# Patient Record
Sex: MALE | Race: WHITE | NOT HISPANIC OR LATINO | Employment: OTHER | URBAN - METROPOLITAN AREA
[De-identification: names, ages, dates, MRNs, and addresses within clinical notes are randomized per-mention and may not be internally consistent; named-entity substitution may affect disease eponyms.]

---

## 2017-11-27 ENCOUNTER — APPOINTMENT (OUTPATIENT)
Dept: LAB | Facility: CLINIC | Age: 67
End: 2017-11-27
Payer: MEDICARE

## 2017-11-27 ENCOUNTER — TRANSCRIBE ORDERS (OUTPATIENT)
Dept: LAB | Facility: CLINIC | Age: 67
End: 2017-11-27

## 2017-11-27 DIAGNOSIS — E78.5 HYPERLIPIDEMIA, UNSPECIFIED HYPERLIPIDEMIA TYPE: Primary | ICD-10-CM

## 2017-11-27 LAB
CHOLEST SERPL-MCNC: 174 MG/DL (ref 50–200)
HDLC SERPL-MCNC: 51 MG/DL (ref 40–60)
LDLC SERPL CALC-MCNC: 105 MG/DL (ref 0–100)
TRIGL SERPL-MCNC: 89 MG/DL

## 2017-11-27 PROCEDURE — 36415 COLL VENOUS BLD VENIPUNCTURE: CPT

## 2017-11-27 PROCEDURE — 80061 LIPID PANEL: CPT

## 2018-03-01 ENCOUNTER — TRANSCRIBE ORDERS (OUTPATIENT)
Dept: LAB | Facility: CLINIC | Age: 68
End: 2018-03-01

## 2018-03-01 ENCOUNTER — APPOINTMENT (OUTPATIENT)
Dept: LAB | Facility: CLINIC | Age: 68
End: 2018-03-01
Payer: MEDICARE

## 2018-03-01 DIAGNOSIS — N40.3 NODULAR PROSTATE WITH URINARY OBSTRUCTION: ICD-10-CM

## 2018-03-01 DIAGNOSIS — R97.20 ELEVATED PROSTATE SPECIFIC ANTIGEN (PSA): ICD-10-CM

## 2018-03-01 DIAGNOSIS — N13.8 NODULAR PROSTATE WITH URINARY OBSTRUCTION: ICD-10-CM

## 2018-03-01 DIAGNOSIS — N13.8 ENLARGED PROSTATE WITH URINARY OBSTRUCTION: Primary | ICD-10-CM

## 2018-03-01 DIAGNOSIS — N40.1 ENLARGED PROSTATE WITH URINARY OBSTRUCTION: Primary | ICD-10-CM

## 2018-03-01 DIAGNOSIS — N40.1 ENLARGED PROSTATE WITH URINARY OBSTRUCTION: ICD-10-CM

## 2018-03-01 DIAGNOSIS — N13.8 ENLARGED PROSTATE WITH URINARY OBSTRUCTION: ICD-10-CM

## 2018-03-01 LAB — PSA SERPL-MCNC: 1.6 NG/ML (ref 0–4)

## 2018-03-01 PROCEDURE — 84153 ASSAY OF PSA TOTAL: CPT

## 2018-05-29 ENCOUNTER — APPOINTMENT (OUTPATIENT)
Dept: LAB | Facility: CLINIC | Age: 68
End: 2018-05-29
Payer: MEDICARE

## 2018-05-29 ENCOUNTER — TRANSCRIBE ORDERS (OUTPATIENT)
Dept: LAB | Facility: CLINIC | Age: 68
End: 2018-05-29

## 2018-05-29 DIAGNOSIS — E78.2 MIXED HYPERLIPIDEMIA: Primary | ICD-10-CM

## 2018-05-29 LAB
CHOLEST SERPL-MCNC: 150 MG/DL (ref 50–200)
HDLC SERPL-MCNC: 45 MG/DL (ref 40–60)
LDLC SERPL CALC-MCNC: 85 MG/DL (ref 0–100)
NONHDLC SERPL-MCNC: 105 MG/DL
TRIGL SERPL-MCNC: 99 MG/DL

## 2018-05-29 PROCEDURE — 36415 COLL VENOUS BLD VENIPUNCTURE: CPT

## 2018-05-29 PROCEDURE — 80061 LIPID PANEL: CPT

## 2018-09-12 ENCOUNTER — HOSPITAL ENCOUNTER (EMERGENCY)
Facility: HOSPITAL | Age: 68
Discharge: HOME | End: 2018-09-12
Attending: EMERGENCY MEDICINE
Payer: MEDICARE

## 2018-09-12 VITALS
RESPIRATION RATE: 18 BRPM | TEMPERATURE: 98.9 F | BODY MASS INDEX: 30.78 KG/M2 | SYSTOLIC BLOOD PRESSURE: 141 MMHG | WEIGHT: 215 LBS | OXYGEN SATURATION: 97 % | HEART RATE: 91 BPM | HEIGHT: 70 IN | DIASTOLIC BLOOD PRESSURE: 89 MMHG

## 2018-09-12 DIAGNOSIS — S01.511A LIP LACERATION, INITIAL ENCOUNTER: Primary | ICD-10-CM

## 2018-09-12 DIAGNOSIS — S60.812A ABRASION OF LEFT WRIST, INITIAL ENCOUNTER: ICD-10-CM

## 2018-09-12 DIAGNOSIS — S09.90XA CLOSED HEAD INJURY, INITIAL ENCOUNTER: ICD-10-CM

## 2018-09-12 PROCEDURE — 90715 TDAP VACCINE 7 YRS/> IM: CPT | Performed by: PHYSICIAN ASSISTANT

## 2018-09-12 PROCEDURE — 99283 EMERGENCY DEPT VISIT LOW MDM: CPT

## 2018-09-12 PROCEDURE — 90471 IMMUNIZATION ADMIN: CPT

## 2018-09-12 RX ORDER — AMOXICILLIN AND CLAVULANATE POTASSIUM 875; 125 MG/1; MG/1
1 TABLET, FILM COATED ORAL EVERY 12 HOURS SCHEDULED
Qty: 10 TABLET | Refills: 0 | Status: SHIPPED | OUTPATIENT
Start: 2018-09-12 | End: 2018-09-17

## 2018-09-12 RX ADMIN — TETANUS TOXOID, REDUCED DIPHTHERIA TOXOID AND ACELLULAR PERTUSSIS VACCINE, ADSORBED 0.5 ML: 5; 2.5; 8; 8; 2.5 SUSPENSION INTRAMUSCULAR at 18:51

## 2018-09-12 RX ADMIN — Medication 1 APPLICATION: at 18:32

## 2018-09-12 NOTE — DISCHARGE INSTRUCTIONS
Facial Laceration   WHAT YOU NEED TO KNOW:   A facial laceration is a tear or cut in the skin caused by blunt or shearing forces, or sharp objects  Facial lacerations may be closed within 24 hours of injury  DISCHARGE INSTRUCTIONS:   Return to the emergency department if:   · You have a fever and the wound is painful, warm, or swollen  The wound area may be red, or fluid may come out of it  · You have heavy bleeding or bleeding that does not stop after 10 minutes of holding firm, direct pressure over the wound  Contact your healthcare provider if:   · Your wound reopens or your tape comes off  · Your wound is very painful  · Your wound is not healing, or you think there is an object in the wound  · The skin around your wound stays numb  · You have questions or concerns about your condition or care  Medicines:   · Antibiotics  may be given to prevent an infection if your wound was deep and had to be cleaned out  · Take your medicine as directed  Contact your healthcare provider if you think your medicine is not helping or if you have side effects  Tell him of her if you are allergic to any medicine  Keep a list of the medicines, vitamins, and herbs you take  Include the amounts, and when and why you take them  Bring the list or the pill bottles to follow-up visits  Carry your medicine list with you in case of an emergency  Care for your wound: It is okay to shower and gently wash her face starting this evening  Do not scrub the area  Do not submerge the area, I e  baths, swimming until it is healed and the sutures were removed  · If your wound was closed with stitches,  keep your wound clean  Decrease scarring: The skin in the area of your wound may turn a different color if it is exposed to direct sunlight  After your wound is healed, use sunscreen over the area when you are out in the sun  You should do this for at least 6 months to 1 year after your injury   Some wounds scar less if they are covered while they heal   Follow up with your healthcare provider as directed:  Have the sutures removed in 5-7 days  This can be done at urgent care, the emergency department, or possibly your family doctor  Check with your family doctor and ask if they are able to do this for you if you wish  © 2017 [address] [employer] Information is for End User's use only and may not be sold, redistributed or otherwise used for commercial purposes  All illustrations and images included in CareNotes® are the copyrighted property of [employer] , curated.by  or Nelson Mobley  The above information is an  only  It is not intended as medical advice for individual conditions or treatments  Talk to your doctor, nurse or pharmacist before following any medical regimen to see if it is safe and effective for you

## 2018-09-12 NOTE — ED NOTES
PT awake and alert, no distress noted  No other questions upon d/c       April Angeli Herbert, RN  09/12/18 4648

## 2018-09-13 NOTE — ED PROVIDER NOTES
History  Chief Complaint   Patient presents with    Fall     patient reports falling approx  4 feet today around 4:30-5pm today without loss of conciousness  patient does not take blood thinning medications  c/o having upper lip problems and pain  80-year-old male presents for evaluation status post fall  He was standing on a step ladder, 2 or so feet above the ground trimming tree branches when the step stool gave out, and he fell forward on his face, striking his upper lip on a tree root that was sticking up out of the ground  No loss of conscious, no vomiting, no amnesia, no headache, no neck pain  No numbness or tingling  The lip as blood quite a lot  Has been icing it since then  Has a small abrasion of the left wrist, but no significant pain, no other complaints  None       Past Medical History:   Diagnosis Date    Hypertension        Past Surgical History:   Procedure Laterality Date    NECK SURGERY         History reviewed  No pertinent family history  I have reviewed and agree with the history as documented  Social History   Substance Use Topics    Smoking status: Never Smoker    Smokeless tobacco: Never Used    Alcohol use No        Review of Systems   Constitutional: Negative for activity change, chills, fatigue and fever  HENT: Negative for congestion, ear pain, facial swelling, nosebleeds, postnasal drip, rhinorrhea, sinus pain, sinus pressure, sore throat and trouble swallowing  Eyes: Negative for visual disturbance  Respiratory: Negative for cough, chest tightness and shortness of breath  Cardiovascular: Negative for chest pain, palpitations and leg swelling  Gastrointestinal: Negative for abdominal pain, blood in stool, constipation, diarrhea, nausea and vomiting  Genitourinary: Negative for dysuria, flank pain, frequency and hematuria  Musculoskeletal: Negative for arthralgias, back pain, neck pain and neck stiffness  Skin: Positive for wound  Negative for rash  Neurological: Negative for dizziness, seizures, syncope, light-headedness, numbness and headaches  All other systems reviewed and are negative  Physical Exam  Physical Exam   Constitutional: He is oriented to person, place, and time  He appears well-developed and well-nourished  No distress  HENT:   Right Ear: External ear normal    Left Ear: External ear normal    Nose: Nose normal    Mouth/Throat: Oropharynx is clear and moist        Upper lip laceration in a 3 armed, stellate pattern  Approximately 2 mm deep  Not full thickness  The inner aspect of the upper lip also has a corresponding macerated, stellate laceration that is very superficial     All teeth are inspected and are not loose  No chips or cracks in any teeth  No septal hematoma    Symmetric facial movement   Eyes: Conjunctivae and EOM are normal  Pupils are equal, round, and reactive to light  Neck: Normal range of motion  Neck supple  No midline cervical spine tenderness   Cardiovascular: Normal rate, regular rhythm and normal heart sounds  Exam reveals no gallop and no friction rub  No murmur heard  Pulmonary/Chest: Effort normal and breath sounds normal  No respiratory distress  He has no wheezes  He has no rales  He exhibits no tenderness  Musculoskeletal: Normal range of motion  Neurological: He is alert and oriented to person, place, and time  Skin: Skin is warm and dry  He is not diaphoretic  Psychiatric: He has a normal mood and affect  His behavior is normal  Judgment and thought content normal    Vitals reviewed        Vital Signs  ED Triage Vitals [09/12/18 1739]   Temperature Pulse Respirations Blood Pressure SpO2   98 9 °F (37 2 °C) 91 18 141/89 97 %      Temp Source Heart Rate Source Patient Position - Orthostatic VS BP Location FiO2 (%)   Oral Monitor Sitting Left arm --      Pain Score       3           Vitals:    09/12/18 1739   BP: 141/89   Pulse: 91   Patient Position - Orthostatic VS: Sitting       Visual Acuity      ED Medications  Medications   LET gel 1 application (1 application Topical Given 9/12/18 4792)   tetanus-diphtheria-acellular pertussis (BOOSTRIX) IM injection 0 5 mL (0 5 mL Intramuscular Given 9/12/18 3271)       Diagnostic Studies  Results Reviewed     None                 No orders to display              Procedures  Lac Repair  Date/Time: 9/12/2018 8:14 PM  Performed by: Toya Muñoz by: Gerardo Castro   Consent: Verbal consent obtained  Risks and benefits: risks, benefits and alternatives were discussed  Consent given by: patient  Patient identity confirmed: provided demographic data  Body area: head/neck  Location details: upper lip  Full thickness lip laceration: no  [place] border involved: no  Laceration length: 2 (total length) cm  Foreign bodies: no foreign bodies  Tendon involvement: none  Nerve involvement: none  Vascular damage: no    Anesthesia:  Local Anesthetic: LET (lido,epi,tetracaine)    Sedation:  Patient sedated: no    Wound Dehiscence:  Superficial Wound Dehiscence: simple closure      Procedure Details:  Irrigation solution: saline  Irrigation method: syringe  Amount of cleaning: standard  Debridement: none  Degree of undermining: none  Skin closure: 5-0 nylon  Number of sutures: 3  Technique: simple  Approximation: close  Approximation difficulty: simple  Patient tolerance: Patient tolerated the procedure well with no immediate complications             Phone Contacts  ED Phone Contact    ED Course                               MDM  Number of Diagnoses or Management Options  Abrasion of left wrist, initial encounter: new and does not require workup  Closed head injury, initial encounter: new and does not require workup  Lip laceration, initial encounter: new and does not require workup  Diagnosis management comments: 53-year-old man presents for evaluation status post fall with upper lip laceration     No other significant injuries, no indication for head CT/C-spine imaging  The laceration was repaired with 3 sutures, no complications, tolerated well  Tetanus updated  We will treat prophylactically with 5 days of Augmentin given the location  Instructed to return to ED or urgent care in 5-7 days for suture removal   Discussed signs of infection that would warrant return to the emergency department  Patient understood and agreed with treatment plan and had no questions  CritCare Time    Disposition  Final diagnoses:   Lip laceration, initial encounter   Closed head injury, initial encounter   Abrasion of left wrist, initial encounter     Time reflects when diagnosis was documented in both MDM as applicable and the Disposition within this note     Time User Action Codes Description Comment    9/12/2018  7:25 PM Dayna Liu [[id number]] Lip laceration, initial encounter     9/12/2018  7:25 PM Kelli Hatfield [[id number]] Closed head injury, initial encounter     9/12/2018  7:25 PM Kelli Hatfield [[id number]] Abrasion of left wrist, initial encounter       ED Disposition     ED Disposition Condition Comment    Discharge  Irena Schmitz discharge to home/self care  Condition at discharge: Good        Follow-up Information     Follow up With Specialties Details Why Contact Info    Urgent care, your family doctor, or emergency department   in 5-7 days for suture removal           Discharge Medication List as of 9/12/2018  7:30 PM      START taking these medications    Details   amoxicillin-clavulanate (AUGMENTIN) 875-125 mg per tablet Take 1 tablet by mouth every 12 (twelve) hours for 5 days, Starting Wed 9/12/2018, Until Mon 9/17/2018, Normal           No discharge procedures on file      ED Provider  Electronically Signed by           Isaac Sampson PA-C  09/12/18 2017

## 2021-04-08 DIAGNOSIS — Z23 ENCOUNTER FOR IMMUNIZATION: ICD-10-CM

## 2022-04-04 ENCOUNTER — TELEPHONE (OUTPATIENT)
Dept: GASTROENTEROLOGY | Facility: CLINIC | Age: 72
End: 2022-04-04

## 2022-04-04 NOTE — TELEPHONE ENCOUNTER
Scheduled date of colonoscopy (as of today):6/6/22  Physician performing colonoscopy:Dr Rodriguez  Location of colonoscopy:[place]  Bowel prep reviewed with patient:MIralax w/ mag & dul  Instructions reviewed with patient by:ls   Clearances: n/a

## 2022-04-04 NOTE — TELEPHONE ENCOUNTER
[facility] Assessment    Name: Kat Mackey  YOB: 1950  Last Height: 5' 10" (1 778 m)  Last weight: 97 5 kg (215 lb)  BMI: None  Procedure: Colon  Diagnosis: hx of sessile polyp/hx of tubular adenoma   Date of procedure: 6/6/22  Prep: Miralax w/ mag & dul   Responsible : yes   Phone#: [phone]  Name completing form: Mac Burden  Date form completed: 04/04/22    If the patient answers yes to any of these questions, schedule in a hospital  Are you pregnant: No  Do you rely on a wheelchair for mobility: No  Have you been diagnosed with End Stage Renal Disease (ESRD): No  Do you need oxygen during the day: No  Have you had a heart attack or stroke within the past three months: No  Have you had a seizure within the past three months: No  Have you ever been informed by anesthesia that you have a difficult airway: No  Additional Questions  Have you had any cardiac testing or are under the care of a Cardiologist (see cardiac list): No  Cardiac list:   Do you have an implanted cardiac defibrillator: No (Comment:  This patient should be scheduled in the hospital)    Have any bleeding problems, such as anemia or hemophilia (If patient has H&H result below 8, schedule in hospital   H&H must be within 30 days of procedure): No    Had an organ transplant within the past 3 months: No    Do you have any present infections: No  Do you get short of breath when walking a few blocks: No  Have you been diagnosed with diabetes: No  Comments (provide cardiac provider information if applicable):

## 2022-05-27 ENCOUNTER — TELEPHONE (OUTPATIENT)
Dept: GASTROENTEROLOGY | Facility: CLINIC | Age: 72
End: 2022-05-27

## 2022-05-27 NOTE — TELEPHONE ENCOUNTER
Spoke to pt confirming his colonoscopy scheduled on 6/6/22 with Dr Stephen Madrigal at [facility]  I informed that [facility] would be calling him 1-2 days prior with arrival time  I informed pt of clear liquid diet day prior as well as doing the bowel cleansing preparation  I also informed that pt will need a  the day of the procedure due to being under sedation  Pt believes that he has his instructions and if he does not or has any questions, he will give us a call

## 2022-06-06 ENCOUNTER — HOSPITAL ENCOUNTER (OUTPATIENT)
Dept: GASTROENTEROLOGY | Facility: AMBULATORY SURGERY CENTER | Age: 72
Discharge: HOME | End: 2022-06-06
Payer: COMMERCIAL

## 2022-06-06 ENCOUNTER — ANESTHESIA EVENT (OUTPATIENT)
Dept: GASTROENTEROLOGY | Facility: AMBULATORY SURGERY CENTER | Age: 72
End: 2022-06-06

## 2022-06-06 ENCOUNTER — ANESTHESIA (OUTPATIENT)
Dept: GASTROENTEROLOGY | Facility: AMBULATORY SURGERY CENTER | Age: 72
End: 2022-06-06

## 2022-06-06 VITALS
OXYGEN SATURATION: 97 % | TEMPERATURE: 97.5 F | RESPIRATION RATE: 18 BRPM | BODY MASS INDEX: 30.06 KG/M2 | HEART RATE: 60 BPM | WEIGHT: 210 LBS | HEIGHT: 70 IN | DIASTOLIC BLOOD PRESSURE: 81 MMHG | SYSTOLIC BLOOD PRESSURE: 121 MMHG

## 2022-06-06 DIAGNOSIS — Z86.010 HX OF ADENOMATOUS COLONIC POLYPS: ICD-10-CM

## 2022-06-06 DIAGNOSIS — K63.5 SESSILE COLONIC POLYP: ICD-10-CM

## 2022-06-06 PROBLEM — E55.9 VITAMIN D DEFICIENCY: Status: ACTIVE | Noted: 2017-05-30

## 2022-06-06 PROBLEM — R31.29 MICROHEMATURIA: Status: ACTIVE | Noted: 2019-09-12

## 2022-06-06 PROBLEM — N13.8 BENIGN PROSTATIC HYPERPLASIA WITH URINARY OBSTRUCTION: Status: ACTIVE | Noted: 2017-01-13

## 2022-06-06 PROBLEM — I77.79 CELIAC ARTERY DISSECTION (HCC): Status: ACTIVE | Noted: 2019-11-25

## 2022-06-06 PROBLEM — M16.12 OSTEOARTHRITIS OF LEFT HIP: Status: ACTIVE | Noted: 2020-07-28

## 2022-06-06 PROBLEM — N40.1 BENIGN PROSTATIC HYPERPLASIA WITH URINARY OBSTRUCTION: Status: ACTIVE | Noted: 2017-01-13

## 2022-06-06 PROCEDURE — 88305 TISSUE EXAM BY PATHOLOGIST: CPT | Performed by: PATHOLOGY

## 2022-06-06 PROCEDURE — 45380 COLONOSCOPY AND BIOPSY: CPT | Performed by: INTERNAL MEDICINE

## 2022-06-06 PROCEDURE — 45385 COLONOSCOPY W/LESION REMOVAL: CPT | Performed by: INTERNAL MEDICINE

## 2022-06-06 RX ORDER — ROSUVASTATIN CALCIUM 10 MG/1
1 TABLET, COATED ORAL DAILY
COMMUNITY
Start: 2022-04-10

## 2022-06-06 RX ORDER — SODIUM CHLORIDE 9 MG/ML
INJECTION, SOLUTION INTRAVENOUS CONTINUOUS PRN
Status: DISCONTINUED | OUTPATIENT
Start: 2022-06-06 | End: 2022-06-06

## 2022-06-06 RX ORDER — LISINOPRIL 20 MG/1
20 TABLET ORAL DAILY
COMMUNITY
Start: 2022-04-10

## 2022-06-06 RX ORDER — PROPOFOL 10 MG/ML
INJECTION, EMULSION INTRAVENOUS AS NEEDED
Status: DISCONTINUED | OUTPATIENT
Start: 2022-06-06 | End: 2022-06-06

## 2022-06-06 RX ORDER — AMLODIPINE BESYLATE 5 MG/1
TABLET ORAL
COMMUNITY
Start: 2022-04-10

## 2022-06-06 RX ADMIN — PROPOFOL 30 MG: 10 INJECTION, EMULSION INTRAVENOUS at 10:41

## 2022-06-06 RX ADMIN — PROPOFOL 20 MG: 10 INJECTION, EMULSION INTRAVENOUS at 10:38

## 2022-06-06 RX ADMIN — PROPOFOL 20 MG: 10 INJECTION, EMULSION INTRAVENOUS at 10:47

## 2022-06-06 RX ADMIN — SODIUM CHLORIDE: 9 INJECTION, SOLUTION INTRAVENOUS at 10:31

## 2022-06-06 RX ADMIN — PROPOFOL 140 MG: 10 INJECTION, EMULSION INTRAVENOUS at 10:35

## 2022-06-06 RX ADMIN — PROPOFOL 20 MG: 10 INJECTION, EMULSION INTRAVENOUS at 10:44

## 2022-06-06 NOTE — ANESTHESIA PREPROCEDURE EVALUATION
Procedure:  COLONOSCOPY    Relevant Problems   CARDIO   (+) Benign essential hypertension   (+) Mixed hyperlipidemia      /RENAL   (+) Benign prostatic hyperplasia with urinary obstruction      MUSCULOSKELETAL   (+) Osteoarthritis of left hip       Physical Exam    Airway    Mallampati score: II  TM Distance: >3 FB  Neck ROM: full     Dental       Cardiovascular      Pulmonary      Other Findings        Anesthesia Plan  ASA Score- 2     Anesthesia Type- IV sedation with anesthesia with ASA Monitors  Additional Monitors:   Airway Plan:           Plan Factors-Exercise tolerance (METS): >4 METS  Chart reviewed  EKG reviewed  Imaging results reviewed  Existing labs reviewed  Patient summary reviewed  Induction- intravenous  Postoperative Plan- Plan for postoperative opioid use  Informed Consent- Anesthetic plan and risks discussed with patient  I personally reviewed this patient with the CRNA  Discussed and agreed on the Anesthesia Plan with the CRNA  Juan Miller

## 2022-06-06 NOTE — ANESTHESIA POSTPROCEDURE EVALUATION
Post-Op Assessment Note    CV Status:  Stable  Pain Score: 0    Pain management: adequate     Mental Status:  Alert and awake   Hydration Status:  Euvolemic   PONV Controlled:  Controlled   Airway Patency:  Patent      Post Op Vitals Reviewed: Yes      Staff: CRNA         No complications documented      BP   104/68   Temp      Pulse  63   Resp   18   SpO2   98%

## 2022-06-06 NOTE — H&P
History and Physical -  [facility]  Mel Haynes 81 y o  male MRN: [id number]                  HPI: Mel Haynes is a 81y o  year old male who presents for history of polyps      REVIEW OF SYSTEMS: Per the HPI, and otherwise unremarkable  Historical Information   Past Medical History:   Diagnosis Date    Arthritis     Colon polyp     Hyperlipidemia     Hypertension      Past Surgical History:   Procedure Laterality Date    COLONOSCOPY      NECK SURGERY      2008    TOTAL HIP ARTHROPLASTY Left     7/2019     Social History   Social History     Substance and Sexual Activity   Alcohol Use Yes    Comment: 5 drinks/week     Social History     Substance and Sexual Activity   Drug Use No     Social History     Tobacco Use   Smoking Status Never Smoker   Smokeless Tobacco Never Used     Family History   Problem Relation Age of Onset    Cancer Father         cancer in abdominal cavity       Meds/Allergies       Current Outpatient Medications:     amLODIPine (NORVASC) 5 mg tablet    Cholecalciferol 25 MCG (1000 UT) tablet    lisinopril (ZESTRIL) 20 mg tablet    rosuvastatin (CRESTOR) 10 MG tablet    TURMERIC PO    No Known Allergies    Objective     /86   Pulse 64   Temp 97 5 °F (36 4 °C) (Skin)   Resp 18   Ht 5' 10" (1 778 m)   Wt 95 3 kg (210 lb)   SpO2 95%   BMI 30 13 kg/m²       PHYSICAL EXAM    Gen: NAD  Head: NCAT  CV: RRR  CHEST: Clear  ABD: soft, NT/ND  EXT: no edema      ASSESSMENT/PLAN:  This is a 81y o  year old male here for colonoscopy, and he is stable and optimized for his procedure

## 2022-06-06 NOTE — H&P
History and Physical -  [facility]  Mel Haynes 81 y o  male MRN: [id number]                  HPI: Mel Haynes is a 81y o  year old male who presents for history of polyps      REVIEW OF SYSTEMS: Per the HPI, and otherwise unremarkable  Historical Information   Past Medical History:   Diagnosis Date    Arthritis     Colon polyp     Hyperlipidemia     Hypertension      Past Surgical History:   Procedure Laterality Date    COLONOSCOPY      NECK SURGERY      2008    TOTAL HIP ARTHROPLASTY Left     7/2019     Social History   Social History     Substance and Sexual Activity   Alcohol Use Yes    Comment: 5 drinks/week     Social History     Substance and Sexual Activity   Drug Use No     Social History     Tobacco Use   Smoking Status Never Smoker   Smokeless Tobacco Never Used     Family History   Problem Relation Age of Onset    Cancer Father         cancer in abdominal cavity       Meds/Allergies       Current Outpatient Medications:     amLODIPine (NORVASC) 5 mg tablet    Cholecalciferol 25 MCG (1000 UT) tablet    lisinopril (ZESTRIL) 20 mg tablet    rosuvastatin (CRESTOR) 10 MG tablet    TURMERIC PO    No Known Allergies    Objective     /86   Pulse 64   Temp 97 5 °F (36 4 °C) (Skin)   Resp 18   Ht 5' 10" (1 778 m)   Wt 95 3 kg (210 lb)   SpO2 95%   BMI 30 13 kg/m²       PHYSICAL EXAM    Gen: NAD  Head: NCAT  CV: RRR  CHEST: Clear  ABD: soft, NT/ND  EXT: no edema      ASSESSMENT/PLAN:  This is a 81y o  year old male here for colonoscopy, and he is stable and optimized for his procedure

## 2022-08-01 ENCOUNTER — EVALUATION (OUTPATIENT)
Dept: PHYSICAL THERAPY | Facility: CLINIC | Age: 72
End: 2022-08-01
Payer: COMMERCIAL

## 2022-08-01 DIAGNOSIS — M54.50 CHRONIC MIDLINE LOW BACK PAIN WITHOUT SCIATICA: Primary | ICD-10-CM

## 2022-08-01 DIAGNOSIS — G89.29 CHRONIC MIDLINE LOW BACK PAIN WITHOUT SCIATICA: Primary | ICD-10-CM

## 2022-08-01 DIAGNOSIS — R29.898 WEAKNESS OF LEFT HIP: ICD-10-CM

## 2022-08-01 PROCEDURE — 97162 PT EVAL MOD COMPLEX 30 MIN: CPT | Performed by: PHYSICAL THERAPIST

## 2022-08-01 NOTE — PROGRESS NOTES
PT Evaluation     Today's date: 2022  Patient name: Brain Fabry  : 1950  MRN: [id number]  Referring provider: Jinny Daniels  Dx:   Encounter Diagnosis     ICD-10-CM    1  Chronic midline low back pain without sciatica  M54 50     G89 29    2  Weakness of left hip  R29 898        Start Time: 1215  Stop Time: 1300  Total time in clinic (min): 45 minutes    Assessment  Assessment details: Gustavo Mayorga is a 81 y o  male who presents with s/s consistent with flexion-based preference  Patient presents to evaluation with pain/stiffness, decreased strength, endurance, and tolerance to activity  Lumbar AROM full with comparable pain noted B/L with SB L>R  Lumbar rotation AROM full with poor movement quality, rotating from the hips/knees/feet to compensate from lack of lumbar rotation  Antalgic gait seen with Trendelenburg, no trunk rotation, flattened thoracic and lumbar curvatures  Comparable pain noted with CPA glides L3/L4 and L4/L5 and L UPA glides L2-L5/S1  SLR (-) for sciatic neural tension B/L  Abnormal multifidi activation B/L  Strength/endurance deficits seen due to lack of motor control/stability of both hip and spinal stabilizers  Discussed risks, benefits, and alternatives to treatment, and answered all patient questions to patient satisfaction  Patient is a good candidate for skilled PT, and would benefit from skilled PT services to address these impairments, achieve goals, and to maximize function  Thank you for the referral     Impairments:  1) Flexion preference  2) B/L hip weakness  3) Multifidi activation deficits  4) Decreased endurance/activity tolerance    Primary movement diagnosis of difficulty with prolonged postures, decreased strength and endurance secondary to flexion based movement preference, B/L hip weakness, and multifidi activation deficits leading to stability, motor control, and endurance deficits      Pt education provided on diagnosis, prognosis, tx, plan of care, HEP, and activity modification  Initial Evaluation conducted and documented by GAMALIEL Dawkins under direct supervision of Hyacinth Villela PT, DPT  Impairments: abnormal coordination, abnormal gait, abnormal muscle firing, abnormal muscle tone, abnormal or restricted ROM, abnormal movement, activity intolerance, impaired physical strength, lacks appropriate home exercise program, pain with function and poor posture   Understanding of Dx/Px/POC: good   Prognosis: good    Goals  Impairment Goals 4-6 weeks  - Decrease pain to <4/10 at worst  - Improve lumbar AROM to equal and pain-free throughout  - Increase hip strength to 4+/5 throughout  - Increase core strength to be able to sit straight up without deviation    Functional Goals 6-8 weeks  - Return to Prior Level of Function  - Patient will be independent with HEP  - Patient will be able to stand for 15min without increased pain/compensation/difficulty  - Patient will be able to play a full golf game without increased pain/compensation/difficulty   - Patient will be able to walk for 15min without increased pain/compensation/difficulty  - Patient will be able to play tennis without increased pain/compensation/difficulty      Plan  Plan details: Assess response to initial eval and progress as tolerated per POC       Patient would benefit from: skilled physical therapy  Planned modality interventions: biofeedback, cryotherapy, electrical stimulation/Russian stimulation, thermotherapy: hydrocollator packs, TENS, unattended electrical stimulation and traction  Planned therapy interventions: abdominal trunk stabilization, activity modification, IADL retraining, joint mobilization, manual therapy, massage, [place] taping, motor coordination training, breathing training, behavior modification, flexibility, functional ROM exercises, home exercise program, therapeutic exercise, therapeutic activities, stretching, strengthening, self care, patient education, neuromuscular re-education and muscle pump exercises  Frequency: 2x week  Duration in weeks: 8        Subjective Evaluation    History of Present Illness  Mechanism of injury: WORK/SCHOOL: Retired salesman/    HOME LIFE: Lives with wife; Has grandchildren    HOBBIES/EXERCISE: Skiing    PLOF:  Fully functional; was able to do yard work    HISTORY OF CURRENT INJURY:  Needs strengthening for L hip after was replaced; had PT for past numbness to toes (strengthening mm b/w vertebrea) - reports helped; has PB at home; past HEP has helped but yoga helps most; riding bikes    Deya Roland reports to clinic with c/o stiffness due to overactivity/prolonged positions  S/S include stiffness, with noted improvement following stretching/yoga  Pt has prior experience with PT for hip replacement s/p 2 years, and N/T into the toes  He reports PT helped and attempted to self-treat presenting condition with previous PT exercises  He states he needs further strengthening for his hip and more structured exercises for the stiffness in his back  He reports feeling inflammation in the area, and that his stiffness impacts bed mobility but does not interrupt sleep  He would like to become more active, specifically with tennis which he has not attempted due to weakness    PAIN LOCATION/DESCRIPTORS: Stiffness onset when overactive; stays in "pelvic area" both sides with no c/o paripheralization    AGGRAVATING FACTORS: prolonged standing; stiffness after playing 12 holes of golf     EASES: walking; yoga (started 2-3x per week); flexion stretching feels better; ibuprofin; tumeric    DAY PATTERN: none in AM; happens when "start doing bunch of things"     IMAGING:  MRI 5-6s years ago - reported spondylolisthesis, DDD, and arthritic changes    SPECIAL QUESTIONS:    Deya Roland denies a new onset of night pain, B/B changes, saddle paraesthesia, SOB, and heart palpitations      PATIENT GOALS: be able to play tennis    Quality of life: fair    Pain  Current pain ratin  At best pain ratin  At worst pain ratin  Quality: dull ache and pressure  Relieving factors: change in position  Progression: no change    Social Support  Steps to enter house: no  Stairs in house: no           Objective     Concurrent Complaints  Negative for bladder dysfunction, bowel dysfunction and saddle (S4) numbness    Static Posture     Thoracic Spine  Hyperkyphosis and flattened thoracic spine  Lumbar Spine   Flattened  Pelvis   Anterior pelvic tilt    Postural Observations  Seated posture: fair        Tenderness     Additional Tenderness Details  Comparable pain noted with CPA glides L3/L4, L4/L5 and UPA glides L L2-L5/S1    Active Range of Motion   Cervical/Thoracic Spine       Thoracic    Flexion:  WFL  Extension:  WFL    Lumbar   Flexion:  WFL  Extension:  WFL  Left lateral flexion:  WFL and with pain  Right lateral flexion:  WFL and with pain  Left rotation:  WF  Right rotation:  [facility]    Additional Active Range of Motion Details  Comparable pain noted with B/L SB (L>R)  B/L rotation - poor quality of movement; motion coming from hips/knees/feet instead of spine    Strength/Myotome Testing     Left Hip   Planes of Motion   Flexion: 3+  Extension: 3+  Abduction: 3+  External rotation: 4  Internal rotation: 4    Right Hip   Planes of Motion   Flexion: 3+  Extension: 4-  Abduction: 4-  External rotation: 4  Internal rotation: 4    Left Knee   Flexion: 4  Extension: 4    Right Knee   Flexion: 4  Extension: 4    Left Ankle/Foot   Dorsiflexion: 4+  Inversion: 4  Eversion: 4  Great toe extension: 4    Right Ankle/Foot   Dorsiflexion: 4+  Inversion: 4  Eversion: 4  Great toe extension: 4    Muscle Activation   Patient able to activate left multifidus and right multifidus       Additional Muscle Activation Details  L mm activation - hamstrings, glutes, then multifidi  R mm activation - glutes, hamstrings, no palpable multifidi activation    Tests     Lumbar     Left   Negative passive SLR      Right   Negative passive SLR       Ambulation     Observational Gait   Gait: antalgic     Additional Observational Gait Details  Decreased thoracic rotation    Functional Assessment      Squat    within functional limits             Diagnosis: Chronic LBP   Precautions: L3/L4/L5 spondlylolisthesis; DDD   Primary Goals: Impairments:  1) Flexion preference  2) B/L hip weakness  3) Multifidi activation deficits  4) Decreased endurance/activity tolerance   *asterisks by exercise = given for HEP   Manuals 8/1                                               There Ex        Bike        LTRs        PB roll outs        X-walks        Piriformis S                                        Neuro Re-Ed        TA activation/PPT        DKTC        SKTC        Bridges        SLRs        Clamshells        Pallof Press        Dead Bugs        Mult with amps NV                                Re-evaluation              Ther Act                                         Modalities

## 2022-08-01 NOTE — LETTER
2022    Cam Edwards [address]  [address]    Patient: Teetee Patel   YOB: 1950   Date of Visit: 2022     Encounter Diagnosis     ICD-10-CM    1  Chronic midline low back pain without sciatica  M54 50     G89 29    2  Weakness of left hip  R29 898        Dear Dr Yariel Burdick: Thank you for your recent referral of Teetee Patel  Please review the attached evaluation summary from Neo's recent visit  Please verify that you agree with the plan of care by signing the attached order  If you have any questions or concerns, please do not hesitate to call  I sincerely appreciate the opportunity to share in the care of one of your patients and hope to have another opportunity to work with you in the near future  Sincerely,    Catherine Edmondson, PT      Referring Provider:      I certify that I have read the below Plan of Care and certify the need for these services furnished under this plan of treatment while under my care  Enzo Piña MD  [address] [place] [id number]  Via Fax: [phone]          PT Evaluation     Today's date: 2022  Patient name: Teetee Patel  : 1950  MRN: [id number]  Referring provider: Jez Goel*  Dx:   Encounter Diagnosis     ICD-10-CM    1  Chronic midline low back pain without sciatica  M54 50     G89 29    2  Weakness of left hip  R29 898        Start Time: 1215  Stop Time: 1300  Total time in clinic (min): 45 minutes    Assessment  Assessment details: Bhargav Bhatia is a 81 y o  male who presents with s/s consistent with flexion-based preference  Patient presents to evaluation with pain/stiffness, decreased strength, endurance, and tolerance to activity  Lumbar AROM full with comparable pain noted B/L with SB L>R   Lumbar rotation AROM full with poor movement quality, rotating from the hips/knees/feet to compensate from lack of lumbar rotation  Antalgic gait seen with Trendelenburg, no trunk rotation, flattened thoracic and lumbar curvatures  Comparable pain noted with CPA glides L3/L4 and L4/L5 and L UPA glides L2-L5/S1  SLR (-) for sciatic neural tension B/L  Abnormal multifidi activation B/L  Strength/endurance deficits seen due to lack of motor control/stability of both hip and spinal stabilizers  Discussed risks, benefits, and alternatives to treatment, and answered all patient questions to patient satisfaction  Patient is a good candidate for skilled PT, and would benefit from skilled PT services to address these impairments, achieve goals, and to maximize function  Thank you for the referral     Impairments:  1) Flexion preference  2) B/L hip weakness  3) Multifidi activation deficits  4) Decreased endurance/activity tolerance    Primary movement diagnosis of difficulty with prolonged postures, decreased strength and endurance secondary to flexion based movement preference, B/L hip weakness, and multifidi activation deficits leading to stability, motor control, and endurance deficits  Pt education provided on diagnosis, prognosis, tx, plan of care, HEP, and activity modification  Initial Evaluation conducted and documented by GAMALIEL Tinsley under direct supervision of Geraldine Choi, PT, DPT       Impairments: abnormal coordination, abnormal gait, abnormal muscle firing, abnormal muscle tone, abnormal or restricted ROM, abnormal movement, activity intolerance, impaired physical strength, lacks appropriate home exercise program, pain with function and poor posture   Understanding of Dx/Px/POC: good   Prognosis: good    Goals  Impairment Goals 4-6 weeks  - Decrease pain to <4/10 at worst  - Improve lumbar AROM to equal and pain-free throughout  - Increase hip strength to 4+/5 throughout  - Increase core strength to be able to sit straight up without deviation    Functional Goals 6-8 weeks  - Return to Prior Level of Function  - Patient will be independent with HEP  - Patient will be able to stand for 15min without increased pain/compensation/difficulty  - Patient will be able to play a full golf game without increased pain/compensation/difficulty   - Patient will be able to walk for 15min without increased pain/compensation/difficulty  - Patient will be able to play tennis without increased pain/compensation/difficulty      Plan  Plan details: Assess response to initial eval and progress as tolerated per POC  Patient would benefit from: skilled physical therapy  Planned modality interventions: biofeedback, cryotherapy, electrical stimulation/Russian stimulation, thermotherapy: hydrocollator packs, TENS, unattended electrical stimulation and traction  Planned therapy interventions: abdominal trunk stabilization, activity modification, IADL retraining, joint mobilization, manual therapy, massage, [place] taping, motor coordination training, breathing training, behavior modification, flexibility, functional ROM exercises, home exercise program, therapeutic exercise, therapeutic activities, stretching, strengthening, self care, patient education, neuromuscular re-education and muscle pump exercises  Frequency: 2x week  Duration in weeks: 8        Subjective Evaluation    History of Present Illness  Mechanism of injury: WORK/SCHOOL: Retired salesman/    HOME LIFE: Lives with wife; Has grandchildren    HOBBIES/EXERCISE: Skiing    PLOF:  Fully functional; was able to do yard work    HISTORY OF CURRENT INJURY:  Needs strengthening for L hip after was replaced; had PT for past numbness to toes (strengthening mm b/w vertebrea) - reports helped; has PB at home; past HEP has helped but yoga helps most; riding bikes    Mery Tucker reports to clinic with c/o stiffness due to overactivity/prolonged positions  S/S include stiffness, with noted improvement following stretching/yoga   Pt has prior experience with PT for hip replacement s/p 2 years, and N/T into the toes  He reports PT helped and attempted to self-treat presenting condition with previous PT exercises  He states he needs further strengthening for his hip and more structured exercises for the stiffness in his back  He reports feeling inflammation in the area, and that his stiffness impacts bed mobility but does not interrupt sleep  He would like to become more active, specifically with tennis which he has not attempted due to weakness    PAIN LOCATION/DESCRIPTORS: Stiffness onset when overactive; stays in "pelvic area" both sides with no c/o paripheralization    AGGRAVATING FACTORS: prolonged standing; stiffness after playing 12 holes of golf     EASES: walking; yoga (started 2-3x per week); flexion stretching feels better; ibuprofin; tumeric    DAY PATTERN: none in AM; happens when "start doing bunch of things"     IMAGING:  MRI 5-6s years ago - reported spondylolisthesis, DDD, and arthritic changes    SPECIAL QUESTIONS:    Heidi Chilel denies a new onset of night pain, B/B changes, saddle paraesthesia, SOB, and heart palpitations  PATIENT GOALS: be able to play tennis    Quality of life: fair    Pain  Current pain ratin  At best pain ratin  At worst pain ratin  Quality: dull ache and pressure  Relieving factors: change in position  Progression: no change    Social Support  Steps to enter house: no  Stairs in house: no           Objective     Concurrent Complaints  Negative for bladder dysfunction, bowel dysfunction and saddle (S4) numbness    Static Posture     Thoracic Spine  Hyperkyphosis and flattened thoracic spine  Lumbar Spine   Flattened       Pelvis   Anterior pelvic tilt    Postural Observations  Seated posture: fair        Tenderness     Additional Tenderness Details  Comparable pain noted with CPA glides L3/L4, L4/L5 and UPA glides L L2-L5/S1    Active Range of Motion   Cervical/Thoracic Spine       Thoracic    Flexion:  WFL  Extension:  WFL    Lumbar   Flexion: WFL  Extension:  WFL  Left lateral flexion:  WFL and with pain  Right lateral flexion:  WFL and with pain  Left rotation:  WFL  Right rotation:  [facility]    Additional Active Range of Motion Details  Comparable pain noted with B/L SB (L>R)  B/L rotation - poor quality of movement; motion coming from hips/knees/feet instead of spine    Strength/Myotome Testing     Left Hip   Planes of Motion   Flexion: 3+  Extension: 3+  Abduction: 3+  External rotation: 4  Internal rotation: 4    Right Hip   Planes of Motion   Flexion: 3+  Extension: 4-  Abduction: 4-  External rotation: 4  Internal rotation: 4    Left Knee   Flexion: 4  Extension: 4    Right Knee   Flexion: 4  Extension: 4    Left Ankle/Foot   Dorsiflexion: 4+  Inversion: 4  Eversion: 4  Great toe extension: 4    Right Ankle/Foot   Dorsiflexion: 4+  Inversion: 4  Eversion: 4  Great toe extension: 4    Muscle Activation   Patient able to activate left multifidus and right multifidus  Additional Muscle Activation Details  L mm activation - hamstrings, glutes, then multifidi  R mm activation - glutes, hamstrings, no palpable multifidi activation    Tests     Lumbar     Left   Negative passive SLR  Right   Negative passive SLR       Ambulation     Observational Gait   Gait: antalgic     Additional Observational Gait Details  Decreased thoracic rotation    Functional Assessment      Squat    within functional limits             Diagnosis: Chronic LBP   Precautions: L3/L4/L5 spondlylolisthesis; DDD   Primary Goals: Impairments:  1) Flexion preference  2) B/L hip weakness  3) Multifidi activation deficits  4) Decreased endurance/activity tolerance   *asterisks by exercise = given for HEP   Manuals 8/1                                               There Ex        Bike        LTRs        PB roll outs        X-walks        Piriformis S                                        Neuro Re-Ed        TA activation/PPT        DKTC        SKTC        Bridges        SLRs Clamshells        Pallof Press        Dead Bugs        Mult with amps NV                                Re-evaluation              Ther Act                                         Modalities

## 2022-08-08 ENCOUNTER — APPOINTMENT (OUTPATIENT)
Dept: PHYSICAL THERAPY | Facility: CLINIC | Age: 72
End: 2022-08-08
Payer: COMMERCIAL

## 2022-08-09 ENCOUNTER — OFFICE VISIT (OUTPATIENT)
Dept: PHYSICAL THERAPY | Facility: CLINIC | Age: 72
End: 2022-08-09
Payer: COMMERCIAL

## 2022-08-09 DIAGNOSIS — G89.29 CHRONIC MIDLINE LOW BACK PAIN WITHOUT SCIATICA: Primary | ICD-10-CM

## 2022-08-09 DIAGNOSIS — R29.898 WEAKNESS OF LEFT HIP: ICD-10-CM

## 2022-08-09 DIAGNOSIS — M54.50 CHRONIC MIDLINE LOW BACK PAIN WITHOUT SCIATICA: Primary | ICD-10-CM

## 2022-08-09 PROCEDURE — 97110 THERAPEUTIC EXERCISES: CPT

## 2022-08-09 PROCEDURE — 97112 NEUROMUSCULAR REEDUCATION: CPT

## 2022-08-09 NOTE — PROGRESS NOTES
Daily Note     Today's date: 2022  Patient name: Jone López  : 1950  MRN: [id number]  Referring provider: Kraig Alejandro*  Dx:   Encounter Diagnosis     ICD-10-CM    1  Chronic midline low back pain without sciatica  M54 50     G89 29    2  Weakness of left hip  R29 898        Start Time: 1000  Stop Time: 1045  Total time in clinic (min): 45 minutes    Subjective: Patient has no new complaints since his initial eval  He states he just did some yoga in the AM to loosen things up  Objective: See treatment diary below      Assessment: Initiated outlined program  Discussed patient wanting HEP program as he will be leaving for over two weeks and will be compliant with exercises  He was able to perform gentle stretching within tolerance  Began TA activation to incorporate into daily function  He was able to perform multifidi activation while breathing and maintain with endurance holds  Updated HEP  Next visit, will add in more strengthening and update HEP for his trip  No exacerbating pain symptoms  Plan: Progress treatment as tolerated         Diagnosis: Chronic LBP   Precautions: L3/L4/L5 spondlylolisthesis; DDD   Primary Goals: Impairments:  1) Flexion preference  2) B/L hip weakness  3) Multifidi activation deficits  4) Decreased endurance/activity tolerance   *asterisks by exercise = given for HEP   Manuals                                               There Ex        Bike  Upright 5 min       LTRs*  5 sec x10 ea       PB roll outs*  5 sec x10 ea       X-walks  NV      Piriformis S*  3x30 sec                                       Neuro Re-Ed        TA activation/PPT*  2 min      DKTC*  10x10 sec       SKTC        Bridges*  2x10       SLRs  With TA 2x10 ea flexion  Add abd     Clamshells  NV      Pallof Press        Dead Bugs        Mult with amps* NV 10x10 sec                               Re-evaluation              Ther Act                                         Modalities

## 2022-08-11 ENCOUNTER — OFFICE VISIT (OUTPATIENT)
Dept: PHYSICAL THERAPY | Facility: CLINIC | Age: 72
End: 2022-08-11
Payer: COMMERCIAL

## 2022-08-11 DIAGNOSIS — M54.50 CHRONIC MIDLINE LOW BACK PAIN WITHOUT SCIATICA: Primary | ICD-10-CM

## 2022-08-11 DIAGNOSIS — G89.29 CHRONIC MIDLINE LOW BACK PAIN WITHOUT SCIATICA: Primary | ICD-10-CM

## 2022-08-11 DIAGNOSIS — R29.898 WEAKNESS OF LEFT HIP: ICD-10-CM

## 2022-08-11 PROCEDURE — 97110 THERAPEUTIC EXERCISES: CPT

## 2022-08-11 PROCEDURE — 97112 NEUROMUSCULAR REEDUCATION: CPT

## 2022-08-11 NOTE — PROGRESS NOTES
Daily Note     Today's date: 2022  Patient name: Mayra Delgado  : 1950  MRN: [id number]  Referring provider: Abby Pink*  Dx:   Encounter Diagnosis     ICD-10-CM    1  Chronic midline low back pain without sciatica  M54 50     G89 29    2  Weakness of left hip  R29 898        Start Time: 1445  Stop Time: 1530  Total time in clinic (min): 45 minutes    Subjective: Patient states he is doing alright  He has no questions about his home exercises program from last visit  Objective: See treatment diary below      Assessment:Continued with outlined program  Patient has no questions at this time about previous program given  Patient was able to progress with strengthening exercises as noted with HEP added as he will be traveling  He remains most challenged with L hip strength, more sets and less reps for SL abd  Yellow and red TBand given for Xwalks for self progressions  Patient educated on soreness with exercises and adjusting his 3 days a week, allowing rest time as well  He has good understanding at this time  He will return to PT in a few weeks  Plan: Progress treatment as tolerated         Diagnosis: Chronic LBP   Precautions: L3/L4/L5 spondlylolisthesis; DDD   Primary Goals: Impairments:  1) Flexion preference  2) B/L hip weakness  3) Multifidi activation deficits  4) Decreased endurance/activity tolerance   *asterisks by exercise = given for HEP   Manuals                                              There Ex        Bike  Upright 5 min  Upright 5 min      LTRs*  5 sec x10 ea       PB roll outs*  5 sec x10 ea       X-walks*  NV 20 feet x2      Piriformis S*  3x30 sec                                       Neuro Re-Ed        TA activation/PPT*  2 min      DKTC*  10x10 sec       SKTC        Bridges*  2x10       SLRs*  With TA 2x10 ea flexion  2x10 haley abd      Clamshells*  NV YTB 2 laps      Pallof Press*   BTB 2x10 ea      Dead Bugs*   2x10 haley      Mult with amps* NV 10x10 sec      Tband rows/ext*   BTB 2x10 ea                       Re-evaluation              Ther Act                                         Modalities

## 2022-09-07 ENCOUNTER — APPOINTMENT (OUTPATIENT)
Dept: PHYSICAL THERAPY | Facility: CLINIC | Age: 72
End: 2022-09-07
Payer: COMMERCIAL

## 2022-09-08 ENCOUNTER — EVALUATION (OUTPATIENT)
Dept: PHYSICAL THERAPY | Facility: CLINIC | Age: 72
End: 2022-09-08
Payer: COMMERCIAL

## 2022-09-08 DIAGNOSIS — M54.50 CHRONIC MIDLINE LOW BACK PAIN WITHOUT SCIATICA: Primary | ICD-10-CM

## 2022-09-08 DIAGNOSIS — G89.29 CHRONIC MIDLINE LOW BACK PAIN WITHOUT SCIATICA: Primary | ICD-10-CM

## 2022-09-08 DIAGNOSIS — R29.898 WEAKNESS OF LEFT HIP: ICD-10-CM

## 2022-09-08 PROCEDURE — 97112 NEUROMUSCULAR REEDUCATION: CPT | Performed by: PHYSICAL THERAPIST

## 2022-09-08 NOTE — PROGRESS NOTES
PT Re-Evaluation     Today's date: 2022  Patient name: Kareem Raymond  : 1950  MRN: [id number]  Referring provider: Ricardo Reeder  Dx:   Encounter Diagnosis     ICD-10-CM    1  Chronic midline low back pain without sciatica  M54 50     G89 29    2  Weakness of left hip  R29 898        Start Time: 1130  Stop Time: 1215  Total time in clinic (min): 45 minutes    Assessment  Assessment details: Tom Bliss is a 81 y o  male who presents to RE with s/s consistent with flexion-based preference and residual L hip weakness from [facility] 2 years ago  Patient demonstrates improvement in strength overall, but continues to have limited lumbar ROM, weakness in L hip, weakness in core and back mm, and decreased endurance that would benefit from continued PT per POC      Impairments:  1) Flexion preference  2) B/L hip weakness  3) Multifidi activation deficits  4) Decreased endurance/activity tolerance    Impairments: abnormal coordination, abnormal gait, abnormal muscle firing, abnormal muscle tone, abnormal or restricted ROM, abnormal movement, activity intolerance, impaired physical strength, lacks appropriate home exercise program, pain with function and poor posture   Understanding of Dx/Px/POC: good   Prognosis: good    Goals  Impairment Goals 4-6 weeks  - Decrease pain to <4/10 at worst - partially met  - Improve lumbar AROM to equal and pain-free throughout - partially met  - Increase hip strength to 4+/5 throughout - partially met  - Increase core strength to be able to sit straight up without deviation - partially met    Functional Goals 6-8 weeks  - Return to Prior Level of Function - partially met  - Patient will be independent with HEP - partially met  - Patient will be able to stand for 15min without increased pain/compensation/difficulty - partially met  - Patient will be able to play a full golf game without increased pain/compensation/difficulty - met  - Patient will be able to walk for 15min without increased pain/compensation/difficulty - partially met  - Patient will be able to play tennis without increased pain/compensation/difficulty - partially met      Plan  Plan details:     Patient would benefit from: skilled physical therapy  Planned therapy interventions: abdominal trunk stabilization, activity modification, IADL retraining, joint mobilization, manual therapy, massage, [place] taping, motor coordination training, breathing training, behavior modification, flexibility, functional ROM exercises, home exercise program, therapeutic exercise, therapeutic activities, stretching, strengthening, self care, patient education, neuromuscular re-education, muscle pump exercises and balance  Frequency: 2x week  Duration in weeks: 4        Subjective Evaluation    History of Present Illness  Mechanism of injury: WORK/SCHOOL: Retired salesman/  HOME LIFE: Lives with wife; Has grandchildren  HOBBIES/EXERCISE: Skiing  PLOF:  Fully functional; was able to do yard work  HISTORY OF CURRENT INJURY:  Needs strengthening for L hip after was replaced July 2020; had PT for past numbness to toes (strengthening mm b/w vertebrea) - reports helped; has PB at home; past HEP has helped but yoga helps most; riding bikes    Update: Pateint reports that he has more of a nerve issue on the L side  He had a hip replacement on the L side  He gets pain that radiates down from L hip into L leg that is resolved when he bends forwards  He is doing the PT exercises and notices this is new since he has been doing those  He wants to continue building up his legs and is not sure what the next step is  He wants to go back to [place] to see if he can get an MRI  He wants to avoid surgery  He continues to have stiffness with prolonged positions  He does note he feels "somewhat better" since IE, and is able to walk a bit better  When he feels weak he tends to "waddle" especially on the L side  He is frustrated by his limitations       PAIN LOCATION/DESCRIPTORS: Stiffness onset when overactive; stays in "pelvic area"   Pain down L lateral leg from hip to knee after activity  AGGRAVATING FACTORS: prolonged standing; stiffness after playing 12 holes of golf, laying on his side  Improved: golf, walking  EASES: walking; yoga (started 2-3x per week); flexion stretching feels better; ibuprofin; tumeric  DAY PATTERN: none in AM; happens when "start doing bunch of things"   IMAGING:  MRI 5-6s years ago - reported spondylolisthesis, DDD, and arthritic changes  SPECIAL QUESTIONS:    Vikash Cuellar denies a new onset of night pain, B/B changes, saddle paraesthesia, SOB, and heart palpitations    PATIENT GOALS: patient wishes to play golf without stiffness - Patient rates himself at 20% improved    Quality of life: fair    Pain  Current pain ratin  At best pain ratin  At worst pain ratin  Quality: dull ache, tight and sharp  Relieving factors: change in position  Progression: improved    Social Support  Steps to enter house: no  Stairs in house: no     Patient Goals  Patient goals for therapy: decreased pain, increased motion, increased strength, independence with ADLs/IADLs and return to sport/leisure activities          Objective     Concurrent Complaints  Negative for bladder dysfunction, bowel dysfunction and saddle (S4) numbness    Postural Observations  Seated posture: fair        Tenderness     Additional Tenderness Details  Comparable pain noted with CPA glides L3/L4, L4/L5 and UPA glides L L2-L5/S1    Active Range of Motion   Cervical/Thoracic Spine       Thoracic    Flexion:  WFL  Extension:  WFL    Lumbar   Flexion:  WFL  Extension:  Restriction level: moderate  Left lateral flexion:  with pain Restriction level: moderate  Right lateral flexion:  WFL  Left rotation:  Restriction level: moderate  Right rotation:  Restriction level: moderate    Additional Active Range of Motion Details  Comparable pain noted with L SB and L ROT    Strength/Myotome Testing     Left Hip   Planes of Motion   Flexion: 4-  Extension: 4-  Abduction: 3+  External rotation: 3+  Internal rotation: 4    Right Hip   Planes of Motion   Flexion: 4-  Extension: 4-  Abduction: 4-  External rotation: 4+  Internal rotation: 4+    Left Knee   Flexion: 4+  Extension: 4-    Right Knee   Flexion: 4+  Extension: 4+    Left Ankle/Foot   Dorsiflexion: 4+  Inversion: 4+  Eversion: 4+  Great toe extension: 4+    Right Ankle/Foot   Dorsiflexion: 4+  Inversion: 4+  Eversion: 4+  Great toe extension: 4+    Muscle Activation   Patient able to activate left multifidus and right multifidus  Additional Muscle Activation Details  L mm activation - hamstrings, glutes, then multifidi  R mm activation - glutes, hamstrings, no palpable multifidi activation    Tests     Lumbar     Left   Negative passive SLR  Right   Negative passive SLR  Left Pelvic Girdle/Sacrum   Negative: thigh thrust      Right Pelvic Girdle/Sacrum   Negative: thigh thrust      Left Hip   Negative ALANNA and FADIR  Right Hip   Negative ALANNA and FADIR       Additional Tests Details  Tightness in haley HS, quads, hip flexors     Ambulation     Observational Gait   Gait: antalgic     Additional Observational Gait Details  Decreased thoracic rotation    Functional Assessment      Squat    within functional limits              Diagnosis: Chronic LBP   Precautions: L3/L4/L5 spondlylolisthesis; DDD   Primary Goals: Impairments:  1) Flexion preference  2) B/L hip weakness  3) Multifidi activation deficits  4) Decreased endurance/activity tolerance   *asterisks by exercise = given for HEP   Manuals 8/1 8/9 8/11 9/8                                            There Ex        Bike  Upright 5 min  Upright 5 min  Upright 5 min    LTRs*  5 sec x10 ea       PB roll outs*  5 sec x10 ea       X-walks*  NV 20 feet x2      Piriformis S*  3x30 sec       Hip flexor S    NV    Quad S    NV    HS S                Neuro Re-Ed        TA activation/PPT*  2 min      DKTC*  10x10 sec       SKTC        Bridges*  2x10       SLRs*  With TA 2x10 ea flexion  2x10 haley abd      Clamshells*  NV YTB 2 laps      Pallof Press*   BTB 2x10 ea      Dead Bugs*   2x10 haley      Mult with amps* NV 10x10 sec      Tband rows/ext*   BTB 2x10 ea                       Re-evaluation        IM, PT      Ther Act                                         Modalities None

## 2022-09-08 NOTE — LETTER
2022    Cam Goff [address]  [address] [facility]    Patient: Sabas Or   YOB: 1950   Date of Visit: 2022     Encounter Diagnosis     ICD-10-CM    1  Chronic midline low back pain without sciatica  M54 50     G89 29    2  Weakness of left hip  R29 898        Dear Dr Arana Kin: Thank you for your recent referral of Sabas Or  Please review the attached evaluation summary from Neo's recent visit  Please verify that you agree with the plan of care by signing the attached order  If you have any questions or concerns, please do not hesitate to call  I sincerely appreciate the opportunity to share in the care of one of your patients and hope to have another opportunity to work with you in the near future  Sincerely,    Alisa Kapoor, PT      Referring Provider:      I certify that I have read the below Plan of Care and certify the need for these services furnished under this plan of treatment while under my care  Jeni Monte MD  [address] [place] [address]  Via Fax: [phone]          PT Re-Evaluation     Today's date: 2022  Patient name: Sabas Or  : 1950  MRN: [id number]  Referring provider: Mari Valdovinos  Dx:   Encounter Diagnosis     ICD-10-CM    1  Chronic midline low back pain without sciatica  M54 50     G89 29    2  Weakness of left hip  R29 898        Start Time: 1130  Stop Time: 1215  Total time in clinic (min): 45 minutes    Assessment  Assessment details: Iraida Rahman is a 81 y o  male who presents to RE with s/s consistent with flexion-based preference and residual L hip weakness from [facility] 2 years ago  Patient demonstrates improvement in strength overall, but continues to have limited lumbar ROM, weakness in L hip, weakness in core and back mm, and decreased endurance that would benefit from continued PT per POC      Impairments:  1) Flexion preference  2) B/L hip weakness  3) Multifidi activation deficits  4) Decreased endurance/activity tolerance    Impairments: abnormal coordination, abnormal gait, abnormal muscle firing, abnormal muscle tone, abnormal or restricted ROM, abnormal movement, activity intolerance, impaired physical strength, lacks appropriate home exercise program, pain with function and poor posture   Understanding of Dx/Px/POC: good   Prognosis: good    Goals  Impairment Goals 4-6 weeks  - Decrease pain to <4/10 at worst - partially met  - Improve lumbar AROM to equal and pain-free throughout - partially met  - Increase hip strength to 4+/5 throughout - partially met  - Increase core strength to be able to sit straight up without deviation - partially met    Functional Goals 6-8 weeks  - Return to Prior Level of Function - partially met  - Patient will be independent with HEP - partially met  - Patient will be able to stand for 15min without increased pain/compensation/difficulty - partially met  - Patient will be able to play a full golf game without increased pain/compensation/difficulty - met  - Patient will be able to walk for 15min without increased pain/compensation/difficulty - partially met  - Patient will be able to play tennis without increased pain/compensation/difficulty - partially met      Plan  Plan details:     Patient would benefit from: skilled physical therapy  Planned therapy interventions: abdominal trunk stabilization, activity modification, IADL retraining, joint mobilization, manual therapy, massage, [place] taping, motor coordination training, breathing training, behavior modification, flexibility, functional ROM exercises, home exercise program, therapeutic exercise, therapeutic activities, stretching, strengthening, self care, patient education, neuromuscular re-education, muscle pump exercises and balance  Frequency: 2x week  Duration in weeks: 4        Subjective Evaluation    History of Present Illness  Mechanism of injury: WORK/SCHOOL: Retired salesman/  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: Lives with wife; Has grandchildren  HOBBIES/EXERCISE: Skiing  PLOF:  Fully functional; was able to do yard work  HISTORY OF CURRENT INJURY:  Needs strengthening for L hip after was replaced July 2020; had PT for past numbness to toes (strengthening mm b/w vertebrea) - reports helped; has PB at home; past HEP has helped but yoga helps most; riding bikes    Update: Pateint reports that he has more of a nerve issue on the L side  He had a hip replacement on the L side  He gets pain that radiates down from L hip into L leg that is resolved when he bends forwards  He is doing the PT exercises and notices this is new since he has been doing those  He wants to continue building up his legs and is not sure what the next step is  He wants to go back to [place] to see if he can get an MRI  He wants to avoid surgery  He continues to have stiffness with prolonged positions  He does note he feels "somewhat better" since IE, and is able to walk a bit better  When he feels weak he tends to "waddle" especially on the L side  He is frustrated by his limitations  PAIN LOCATION/DESCRIPTORS: Stiffness onset when overactive; stays in "pelvic area"   Pain down L lateral leg from hip to knee after activity  AGGRAVATING FACTORS: prolonged standing; stiffness after playing 12 holes of golf, laying on his side  Improved: golf, walking  EASES: walking; yoga (started 2-3x per week); flexion stretching feels better; ibuprofin; tumeric  DAY PATTERN: none in AM; happens when "start doing bunch of things"   IMAGING:  MRI 5-6s years ago - reported spondylolisthesis, DDD, and arthritic changes  SPECIAL QUESTIONS:    Iraida Rahman denies a new onset of night pain, B/B changes, saddle paraesthesia, SOB, and heart palpitations    PATIENT GOALS: patient wishes to play golf without stiffness - Patient rates himself at 20% improved    Quality of life: fair    Pain  Current pain ratin  At best pain ratin  At worst pain ratin  Quality: dull ache, tight and sharp  Relieving factors: change in position  Progression: improved    Social Support  Steps to enter house: no  Stairs in house: no     Patient Goals  Patient goals for therapy: decreased pain, increased motion, increased strength, independence with ADLs/IADLs and return to sport/leisure activities          Objective     Concurrent Complaints  Negative for bladder dysfunction, bowel dysfunction and saddle (S4) numbness    Postural Observations  Seated posture: fair        Tenderness     Additional Tenderness Details  Comparable pain noted with CPA glides L3/L4, L4/L5 and UPA glides L L2-L5/S1    Active Range of Motion   Cervical/Thoracic Spine       Thoracic    Flexion:  WFL  Extension:  WFL    Lumbar   Flexion:  WFL  Extension:  Restriction level: moderate  Left lateral flexion:  with pain Restriction level: moderate  Right lateral flexion:  WFL  Left rotation:  Restriction level: moderate  Right rotation:  Restriction level: moderate    Additional Active Range of Motion Details  Comparable pain noted with L SB and L ROT    Strength/Myotome Testing     Left Hip   Planes of Motion   Flexion: 4-  Extension: 4-  Abduction: 3+  External rotation: 3+  Internal rotation: 4    Right Hip   Planes of Motion   Flexion: 4-  Extension: 4-  Abduction: 4-  External rotation: 4+  Internal rotation: 4+    Left Knee   Flexion: 4+  Extension: 4-    Right Knee   Flexion: 4+  Extension: 4+    Left Ankle/Foot   Dorsiflexion: 4+  Inversion: 4+  Eversion: 4+  Great toe extension: 4+    Right Ankle/Foot   Dorsiflexion: 4+  Inversion: 4+  Eversion: 4+  Great toe extension: 4+    Muscle Activation   Patient able to activate left multifidus and right multifidus       Additional Muscle Activation Details  L mm activation - hamstrings, glutes, then multifidi  R mm activation - glutes, hamstrings, no palpable multifidi activation    Tests     Lumbar Left   Negative passive SLR  Right   Negative passive SLR  Left Pelvic Girdle/Sacrum   Negative: thigh thrust      Right Pelvic Girdle/Sacrum   Negative: thigh thrust      Left Hip   Negative ALANNA and FADIR  Right Hip   Negative ALANNA and FADIR       Additional Tests Details  Tightness in haley HS, quads, hip flexors     Ambulation     Observational Gait   Gait: antalgic     Additional Observational Gait Details  Decreased thoracic rotation    Functional Assessment      Squat    within functional limits              Diagnosis: Chronic LBP   Precautions: L3/L4/L5 spondlylolisthesis; DDD   Primary Goals: Impairments:  1) Flexion preference  2) B/L hip weakness  3) Multifidi activation deficits  4) Decreased endurance/activity tolerance   *asterisks by exercise = given for HEP   Manuals 8/1 8/9 8/11 9/8                                            There Ex        Bike  Upright 5 min  Upright 5 min  Upright 5 min    LTRs*  5 sec x10 ea       PB roll outs*  5 sec x10 ea       X-walks*  NV 20 feet x2      Piriformis S*  3x30 sec       Hip flexor S    NV    Quad S    NV    HS S                Neuro Re-Ed        TA activation/PPT*  2 min      DKTC*  10x10 sec       SKTC        Bridges*  2x10       SLRs*  With TA 2x10 ea flexion  2x10 haley abd      Clamshells*  NV YTB 2 laps      Pallof Press*   BTB 2x10 ea      Dead Bugs*   2x10 haley      Mult with amps* NV 10x10 sec      Tband rows/ext*   BTB 2x10 ea                       Re-evaluation        IM, PT      Ther Act                                         Modalities

## 2022-09-09 ENCOUNTER — APPOINTMENT (OUTPATIENT)
Dept: PHYSICAL THERAPY | Facility: CLINIC | Age: 72
End: 2022-09-09
Payer: COMMERCIAL

## 2022-09-12 ENCOUNTER — OFFICE VISIT (OUTPATIENT)
Dept: PHYSICAL THERAPY | Facility: CLINIC | Age: 72
End: 2022-09-12
Payer: COMMERCIAL

## 2022-09-12 DIAGNOSIS — R29.898 WEAKNESS OF LEFT HIP: ICD-10-CM

## 2022-09-12 DIAGNOSIS — G89.29 CHRONIC MIDLINE LOW BACK PAIN WITHOUT SCIATICA: Primary | ICD-10-CM

## 2022-09-12 DIAGNOSIS — M54.50 CHRONIC MIDLINE LOW BACK PAIN WITHOUT SCIATICA: Primary | ICD-10-CM

## 2022-09-12 PROCEDURE — 97110 THERAPEUTIC EXERCISES: CPT | Performed by: PHYSICAL THERAPIST

## 2022-09-12 PROCEDURE — 97112 NEUROMUSCULAR REEDUCATION: CPT | Performed by: PHYSICAL THERAPIST

## 2022-09-12 NOTE — PROGRESS NOTES
Daily Note     Today's date: 2022  Patient name: John Miller  : 1950  MRN: [id number]  Referring provider: Kristen Chou  Dx:   Encounter Diagnosis     ICD-10-CM    1  Chronic midline low back pain without sciatica  M54 50     G89 29    2  Weakness of left hip  R29 898                   Subjective: Patient stated no significant pain prior to treatment session  Objective: See treatment diary below      Assessment: Patient performed additions and progressions as listed without c/o pain; no c/o at completion of treatment session  Plan: Continue per plan of care  Diagnosis: Chronic LBP   Precautions: L3/L4/L5 spondlylolisthesis; DDD   Primary Goals: Impairments:  1) Flexion preference  2) B/L hip weakness  3) Multifidi activation deficits  4) Decreased endurance/activity tolerance   *asterisks by exercise = given for HEP   Manuals                                            There Ex        Bike  Upright 5 min  Upright 5 min  Upright 5 min Recum  5 min   LTRs*  5 sec x10 ea       PB roll outs*  5 sec x10 ea       X-walks*  NV 20 feet x2      Piriformis S*  3x30 sec       Hip flexor S    NV  supine strap 3x30" ea  Quad S    NV Prone w/strap 3x30" ea  HS S                Neuro Re-Ed        TA activation/PPT*  2 min      DKTC*  10x10 sec       SKTC        Bridges*  2x10       SLRs*  With TA 2x10 ea flexion  2x10 haley abd   Flex + abd   2x10 ea  Clamshells*  NV YTB 2 laps   GTB 20x3" ea  Pallof Press*   BTB 2x10 ea   15# 2x10 ea  Dead Bugs*   2x10 haley   2x10 ea  Mult with amps* NV 10x10 sec      Tband rows/ext*   BTB 2x10 ea   [place] 25# 3x10 ea                      Re-evaluation        IM, PT      Ther Act                                         Modalities

## 2022-09-14 ENCOUNTER — OFFICE VISIT (OUTPATIENT)
Dept: PHYSICAL THERAPY | Facility: CLINIC | Age: 72
End: 2022-09-14
Payer: COMMERCIAL

## 2022-09-14 DIAGNOSIS — M54.50 CHRONIC MIDLINE LOW BACK PAIN WITHOUT SCIATICA: Primary | ICD-10-CM

## 2022-09-14 DIAGNOSIS — G89.29 CHRONIC MIDLINE LOW BACK PAIN WITHOUT SCIATICA: Primary | ICD-10-CM

## 2022-09-14 DIAGNOSIS — R29.898 WEAKNESS OF LEFT HIP: ICD-10-CM

## 2022-09-14 PROCEDURE — 97112 NEUROMUSCULAR REEDUCATION: CPT

## 2022-09-14 PROCEDURE — 97110 THERAPEUTIC EXERCISES: CPT

## 2022-09-14 NOTE — PROGRESS NOTES
Daily Note     Today's date: 2022  Patient name: Sinai Quinones  : 1950  MRN: [id number]  Referring provider: Yoli Sorensen*  Dx:   Encounter Diagnosis     ICD-10-CM    1  Chronic midline low back pain without sciatica  M54 50     G89 29    2  Weakness of left hip  R29 898        Start Time: 1000  Stop Time: 1045  Total time in clinic (min): 45 minutes    Subjective: Patient states he has no pain today  He can just feel the muscles are tight when he gets up in the morning  He notices weakness when stair climbing in his L hip  Patient states he will be returning to his referring with an update  He will participate in therapy today and next Monday but then will be leaving to go down [place] [address] until November, which he will call for a follow up re-eval in November  Objective: See treatment diary below      Assessment: Continued with outlined program  Patient was able to begin with gentle stretching with noticeable tightness  Patient has improved tolerance to multifidus activation with proper isolation and breathing  Added resistance to hip strengthening exercises with moderate muscle fatigue  No complaints of pain throughout session  He is making steady progress at this time  Plan: Progress treatment as tolerated  Diagnosis: Chronic LBP   Precautions: L3/L4/L5 spondlylolisthesis; DDD   Primary Goals: Impairments:  1) Flexion preference  2) B/L hip weakness  3) Multifidi activation deficits  4) Decreased endurance/activity tolerance   *asterisks by exercise = given for HEP   Manuals                                            There Ex        Bike Upright 5 min  Upright 5 min  Upright 5 min  Upright 5 min Recum  5 min   LTRs*  5 sec x10 ea       PB roll outs*  5 sec x10 ea       X-walks*  NV 20 feet x2      Piriformis S*  3x30 sec       Hip flexor S Supine strap 3x30 sec ea   NV  supine strap 3x30" ea  Quad S    NV Prone w/strap 3x30" ea     HS S Neuro Re-Ed        TA activation/PPT*  2 min      DKTC*  10x10 sec       SKTC        Bridges* GTB 5 sec x20  2x10       SLRs*  With TA 2x10 ea flexion  2x10 haley abd   Flex + abd   2x10 ea  Clamshells* GTB 3x10 haley  NV YTB 2 laps   GTB 20x3" ea  Pallof Press*   BTB 2x10 ea   15# 2x10 ea  Dead Bugs*   2x10 haley   2x10 ea  Mult with amps* With biofeedback (red--->grey) 10x10 sec  10x10 sec      Tband rows/ext* [place]  Rows:25# 3x10  Ext:25# 3x10   BTB 2x10 ea   [place] 25# 3x10 ea                      Re-evaluation        IM, PT      Ther Act                                         Modalities

## 2022-09-19 ENCOUNTER — OFFICE VISIT (OUTPATIENT)
Dept: PHYSICAL THERAPY | Facility: CLINIC | Age: 72
End: 2022-09-19
Payer: COMMERCIAL

## 2022-09-19 DIAGNOSIS — M54.50 CHRONIC MIDLINE LOW BACK PAIN WITHOUT SCIATICA: Primary | ICD-10-CM

## 2022-09-19 DIAGNOSIS — R29.898 WEAKNESS OF LEFT HIP: ICD-10-CM

## 2022-09-19 DIAGNOSIS — G89.29 CHRONIC MIDLINE LOW BACK PAIN WITHOUT SCIATICA: Primary | ICD-10-CM

## 2022-09-19 PROCEDURE — 97112 NEUROMUSCULAR REEDUCATION: CPT

## 2022-09-19 PROCEDURE — 97110 THERAPEUTIC EXERCISES: CPT

## 2022-09-19 NOTE — PROGRESS NOTES
Daily Note     Today's date: 2022  Patient name: Derrell Vilchis  : 1950  MRN: [id number]  Referring provider: Ike Schuler*  Dx:   Encounter Diagnosis     ICD-10-CM    1  Chronic midline low back pain without sciatica  M54 50     G89 29    2  Weakness of left hip  R29 898        Start Time: 0700  Stop Time: 0745  Total time in clinic (min): 45 minutes    Subjective: Patient states he returned to his doctor who states he should continue with PT  She is sending him for an MRI today  He states he has been doing his stretches each morning  He states Saturday night he was sitting for about an hour and went to stand up and he was stiff  Objective: See treatment diary below      Assessment: Continued with outlined program  Patient has good tolerance to stretching to begin session  Educated on stretch times for HEP and duration to improve flexibility with good understanding  Patient was able to increase intensity for muscle activation of multifidus using biofeedback with good tolerance, only requiring cues for breathing  He was able to progress with bridges on ball, elbows on table for support, with some sway when bridging  Added resisted bands for postural correction and strengthening with core activation with muscle fatigue  He is continuing to progress well towards goals  Patient will discharge at this time as he will be traveling then heading [address]  He will call/return to therapy if he needs further assistance  Plan: Discharge to [facility]  Diagnosis: Chronic LBP   Precautions: L3/L4/L5 spondlylolisthesis; DDD   Primary Goals: Impairments:  1) Flexion preference  2) B/L hip weakness  3) Multifidi activation deficits  4) Decreased endurance/activity tolerance   *asterisks by exercise = given for HEP   Manuals                                            There Ex        Bike Upright 5 min  Upright 5 min  Upright 5 min  Upright 5 min Recum   5 min   LTRs*        PB roll outs*        X-walks*   20 feet x2      Piriformis S*        Hip flexor S Supine strap 3x30 sec ea 3x30 sec supine strap   NV  supine strap 3x30" ea  Quad S  Prone with strap 3x30 sec ea   NV Prone w/strap 3x30" ea  HS S                Neuro Re-Ed        TA activation/PPT*        TA marches         DKTC*        SKTC        Bridges* GTB 5 sec x20  On ball   Elbows on table 2x10       SLRs*   2x10 jerry abd   Flex + abd   2x10 ea  Clamshells* GTB 3x10 jerry   YTB 2 laps   GTB 20x3" ea  Pall[employer]*  15# 3x10 ea  BTB 2x10 ea   15# 2x10 ea  Dead Bugs*   2x10 jerry   2x10 ea  Mult with amps* With biofeedback (red--->grey) 10x10 sec  With biofeedback   Red---> orange 10x10 sec       Tband rows/ext* [place]  Rows:25# 3x10  Ext:25# 3x10  Gabi   Rows:25# 3x10  Ext:25# 3x10  BTB 2x10 ea   [place] 25# 3x10 ea     No monies  RTB 2x10       Jerry h-abd   RTB 2x10                        Re-evaluation       IM, PT      Ther Act                                      Modalities

## 2024-05-24 DIAGNOSIS — Z00.6 ENCOUNTER FOR EXAMINATION FOR NORMAL COMPARISON OR CONTROL IN CLINICAL RESEARCH PROGRAM: ICD-10-CM

## 2024-07-05 ENCOUNTER — APPOINTMENT (OUTPATIENT)
Dept: LAB | Facility: CLINIC | Age: 74
End: 2024-07-05

## 2024-07-05 DIAGNOSIS — Z00.6 ENCOUNTER FOR EXAMINATION FOR NORMAL COMPARISON OR CONTROL IN CLINICAL RESEARCH PROGRAM: ICD-10-CM

## 2024-07-05 PROCEDURE — 36415 COLL VENOUS BLD VENIPUNCTURE: CPT

## 2024-08-06 LAB
APOB+LDLR+PCSK9 GENE MUT ANL BLD/T: NOT DETECTED
BRCA1+BRCA2 DEL+DUP + FULL MUT ANL BLD/T: NOT DETECTED
MLH1+MSH2+MSH6+PMS2 GN DEL+DUP+FUL M: NOT DETECTED

## 2024-11-04 ENCOUNTER — OFFICE VISIT (OUTPATIENT)
Dept: NEUROSURGERY | Facility: CLINIC | Age: 74
End: 2024-11-04
Payer: COMMERCIAL

## 2024-11-04 VITALS
OXYGEN SATURATION: 97 % | BODY MASS INDEX: 29.35 KG/M2 | HEART RATE: 79 BPM | HEIGHT: 70 IN | WEIGHT: 205 LBS | DIASTOLIC BLOOD PRESSURE: 80 MMHG | TEMPERATURE: 97.7 F | SYSTOLIC BLOOD PRESSURE: 140 MMHG

## 2024-11-04 DIAGNOSIS — M48.062 LUMBAR STENOSIS WITH NEUROGENIC CLAUDICATION: ICD-10-CM

## 2024-11-04 DIAGNOSIS — M43.16 SPONDYLOLISTHESIS OF LUMBAR REGION: Primary | ICD-10-CM

## 2024-11-04 DIAGNOSIS — M54.50 LOWER BACK PAIN: ICD-10-CM

## 2024-11-04 PROCEDURE — 99204 OFFICE O/P NEW MOD 45 MIN: CPT | Performed by: NEUROLOGICAL SURGERY

## 2024-11-04 RX ORDER — GABAPENTIN 300 MG/1
CAPSULE ORAL
Qty: 90 CAPSULE | Refills: 0 | Status: SHIPPED | OUTPATIENT
Start: 2024-11-04 | End: 2024-12-17

## 2024-11-04 NOTE — PROGRESS NOTES
Ambulatory Visit  Name: Neo Astudillo      : 1950      MRN: [id number]  Encounter Provider: Craig Robert Goldberg, MD  Encounter Date: 2024   Encounter department: [facility] [place]    Assessment & Plan  Lower back pain  See below  Orders:    Ambulatory Referral to Neurosurgery    Spondylolisthesis of lumbar region       see below  Lumbar stenosis with neurogenic claudication  His stenosis is most severe at the L4-5 level but also present at L3-4, less so at L2-3 and L5-S1.  Most of his symptoms fit with neurogenic claudication.  I think some of his back pain is claudicating in nature as well.  He gets back and leg pain and numbness simply with standing.  Walking and doing yard work does make this worse.  Walking with a shopping cart makes it better as does leaning forward.  He has tried conservative measures including multiple epidural steroid injections with benefit but diminishing returns over several years.  He has done physical therapy in the past.  He continues to do those exercises.  I think decompressive lumbar laminotomies and foraminotomies at L3-S1 are likely to benefit his leg complaints with standing and walking and to some extent his back pain.  Given the spondylolisthesis at L5-S1, grade 2, I would consider instrumented fusion for him.  This would involve pedicle screws and since I would be decompressing L3-S1 I would consider instrumenting each of those levels with pedicle screws.  We discussed the option of an interbody type device placed via many different approaches particularly at the L5-S1 level.  This might help to achieve more surface area for fusion.  I talked with him and his wife about risk benefits and alternatives to the above described surgery.  He is running out of other alternatives.  He can continue with steroid injections.  He has not tried bracing.  He may consider a lumbar corset though I doubt this will be sufficient to treat his symptoms.   We talked about a TLSO type brace.  I would plan on using that as an adjunct after surgery.  I think it is unlikely to improve his pain before surgery but I would be more than happy to prescribe it for him if he changes his mind.  I talked him about Neurontin.  He has not tried that to date.  In a few select patients with his complaint profile, I have seen Neurontin benefit.  I prescribed Neurontin(gabapentin) for the patient with my typical ramp up of dosin mg nightly for 1 week, followed by 600 mg nightly for 1 week, followed by 900 mg nightly to continue if effective.  The patient understands that Neurontin may not begin to take effect for several days.  Therefore, as long as there are no bothersome side effects, the patient should take the medication for at least 10 to 14 days before deciding that it is not effective.  We discussed some typical side effects of Neurontin, including, feeling sleepy, not quite like yourself, lethargic, visual changes, and peripheral edema.  These side effects, if they occur, are usually mild and limited to the first few days.  However, if the patient finds any side effects difficult to tolerate, then they should just stop Neurontin without the need to wean off of it.  On the other hand, if they find that the Neurontin is effective, they may choose to take 300 mg 3 times a day instead of all 900 mg at night.  For some patients, this regimen is better tolerated and more effective.  I also explained to the patient that, if they are on Neurontin long-term, that they should not just stop the medication due to a very slight increased risk of having a seizure from stopping the medicine too quickly.  Instead, they should taper down off of the Neurontin by taking 600 mg nightly for 1 week, followed by 300 mg nightly for 1 week, followed by 300 mg every other night for 1 week, and then discontinuing the Neurontin.  I think my opinion is largely concordant with his first opinion at  "[facility].  I would be happy to continue to participate in his care however he and his wife deem fit.  All questions answered.             History of Present Illness   HPI  Started 3 years ago.  Worsening over the last year  Lower body goes numb with standing.  Improves with walking  \"Losing strength\" while climbing stairs in hips and core  Walking 2 or 3 miles is not as easy as it used to be  Back stiffness with activities like yardwork.  This goes into thighs  ANDRIY every 2 -3 months. Total of 6 or 8 over a couple of years  Valsalva - no changes  No incontinence  Both legs are the same  Medications for back or legs: tylenol occ, muscle relaxant  Never tried gabapentin, lyrica, cymbalta  No brace  Shopping cart: leaning on it leads to walking further  PT a few years ago without durable benefit.  Does some of these exercises and yoga at times as well.    Review of Systems   Constitutional: Negative.    HENT: Negative.     Eyes: Negative.    Respiratory: Negative.     Cardiovascular: Negative.    Gastrointestinal: Negative.    Endocrine: Negative.    Genitourinary: Negative.    Musculoskeletal:  Positive for back pain and gait problem (can walk up to 3 miles but much slower pace then lower back/B/L hips stiffens up).        Consult 2nd opinion  Lumbar stenosis w/neurogenic claudication  XR L/S 9/23/24 [facility]  MRI L/S 8/6/24 [facility] in pacs  3/10 LBP radiating down BLE stops above knees  N/T/W BLE standing agitates the numbness  PT-  None  PM- [facility]   injection  8/14/24 Relief 70% lasting 2-3 months   Eliquis/Non smoker/no previous back sx   H/O  C spine sx 15 yrs ago Dr. Alcantara      Skin: Negative.    Allergic/Immunologic: Negative.    Neurological:  Positive for weakness (prolonged standing  3-5 minutes then needs to walk or change positions) and numbness.   Hematological: Negative.    Psychiatric/Behavioral: Negative.     All other systems reviewed and are negative.    I have personally reviewed the MA's review of " "systems and made changes as necessary.    Past Medical History   Past Medical History:   Diagnosis Date    Arthritis     Colon polyp     Hyperlipidemia     Hypertension      Past Surgical History:   Procedure Laterality Date    COLONOSCOPY      NECK SURGERY      2008    TOTAL HIP ARTHROPLASTY Left     7/2019     Family History   Problem Relation Age of Onset    Cancer Father         cancer in abdominal cavity     Current Outpatient Medications on File Prior to Visit   Medication Sig Dispense Refill    amLODIPine (NORVASC) 5 mg tablet TAKE 1 TABLET BY MOUTH EVERY DAY FOR HIGH BLOOD PRESSURE      apixaban (Eliquis) 5 mg       Cholecalciferol 25 MCG (1000 UT) tablet Take 1,000 Units by mouth      lisinopril (ZESTRIL) 20 mg tablet Take 20 mg by mouth daily      Omega 3-6-9 Fatty Acids (OMEGA 3-6-9 PO) Take 2 g by mouth daily      rosuvastatin (CRESTOR) 10 MG tablet Take 1 tablet by mouth daily       No current facility-administered medications on file prior to visit.   No Known Allergies   Social History     Tobacco Use    Smoking status: Never    Smokeless tobacco: Never   Vaping Use    Vaping status: Never Used   Substance and Sexual Activity    Alcohol use: Yes     Comment: 5 drinks/week    Drug use: No    Sexual activity: Not on file     Objective     /80   Pulse 79   Temp 97.7 °F (36.5 °C)   Ht 5' 10\" (1.778 m)   Wt 93 kg (205 lb)   SpO2 97%   BMI 29.41 kg/m²     Physical Exam  Neurologic Exam  Physical exam:     Well-developed well-nourished  Appears stated age of 74 y.o.  Awake alert oriented x3  Verbally interactive and appropriate  Adequate fund of knowledge  Cranial nerves II through VIII intact   Motor strength 5 out of 5   No pronator drift  Sensory intact to light touch  No sensory changes on the dorsum of the spine.  without pain to palpation over the Cervical, Thoracic, Lumbar, and Sacrum  Reflexes 1+and symmetric  Toes: downgoing  Gait: WNL  Heel walk: WNL  Toe walk:: WNL  Tandem walk: " WNL  Straight leg raising: without leg pain  without right sided hip pain with hip rotation  without left sided hip pain with hip rotation  Memory intact  Mood: Euthymic affect: Full range  Language: Fluent in English  Good peripheral pulses in bilateral lower  extremities      I personally reviewed the following image studies in PACS and associated radiology reports: MRI spine. My interpretation of the radiology images/reports is: Lumbar stenosis with GRII spondy L5/S1, moves some on flex X-rays.

## 2024-11-04 NOTE — ASSESSMENT & PLAN NOTE
His stenosis is most severe at the L4-5 level but also present at L3-4, less so at L2-3 and L5-S1.  Most of his symptoms fit with neurogenic claudication.  I think some of his back pain is claudicating in nature as well.  He gets back and leg pain and numbness simply with standing.  Walking and doing yard work does make this worse.  Walking with a shopping cart makes it better as does leaning forward.  He has tried conservative measures including multiple epidural steroid injections with benefit but diminishing returns over several years.  He has done physical therapy in the past.  He continues to do those exercises.  I think decompressive lumbar laminotomies and foraminotomies at L3-S1 are likely to benefit his leg complaints with standing and walking and to some extent his back pain.  Given the spondylolisthesis at L5-S1, grade 2, I would consider instrumented fusion for him.  This would involve pedicle screws and since I would be decompressing L3-S1 I would consider instrumenting each of those levels with pedicle screws.  We discussed the option of an interbody type device placed via many different approaches particularly at the L5-S1 level.  This might help to achieve more surface area for fusion.  I talked with him and his wife about risk benefits and alternatives to the above described surgery.  He is running out of other alternatives.  He can continue with steroid injections.  He has not tried bracing.  He may consider a lumbar corset though I doubt this will be sufficient to treat his symptoms.  We talked about a TLSO type brace.  I would plan on using that as an adjunct after surgery.  I think it is unlikely to improve his pain before surgery but I would be more than happy to prescribe it for him if he changes his mind.  I talked him about Neurontin.  He has not tried that to date.  In a few select patients with his complaint profile, I have seen Neurontin benefit.  I prescribed Neurontin(gabapentin) for  the patient with my typical ramp up of dosin mg nightly for 1 week, followed by 600 mg nightly for 1 week, followed by 900 mg nightly to continue if effective.  The patient understands that Neurontin may not begin to take effect for several days.  Therefore, as long as there are no bothersome side effects, the patient should take the medication for at least 10 to 14 days before deciding that it is not effective.  We discussed some typical side effects of Neurontin, including, feeling sleepy, not quite like yourself, lethargic, visual changes, and peripheral edema.  These side effects, if they occur, are usually mild and limited to the first few days.  However, if the patient finds any side effects difficult to tolerate, then they should just stop Neurontin without the need to wean off of it.  On the other hand, if they find that the Neurontin is effective, they may choose to take 300 mg 3 times a day instead of all 900 mg at night.  For some patients, this regimen is better tolerated and more effective.  I also explained to the patient that, if they are on Neurontin long-term, that they should not just stop the medication due to a very slight increased risk of having a seizure from stopping the medicine too quickly.  Instead, they should taper down off of the Neurontin by taking 600 mg nightly for 1 week, followed by 300 mg nightly for 1 week, followed by 300 mg every other night for 1 week, and then discontinuing the Neurontin.  I think my opinion is largely concordant with his first opinion at [facility].  I would be happy to continue to participate in his care however he and his wife deem fit.  All questions answered.

## 2024-11-25 ENCOUNTER — TELEPHONE (OUTPATIENT)
Dept: NEUROSURGERY | Facility: CLINIC | Age: 74
End: 2024-11-25

## 2024-11-25 NOTE — TELEPHONE ENCOUNTER
Reason for call:   [x] Prior Auth  [] Other:     Caller:  [] Patient  [x] Pharmacy  [facility]/pharmacy #[id number] - [place] - [address] [address]  Phone: [phone]  Fax: [phone]       Medication: gabapentin (Neurontin) 300 mg capsule     Dose/Frequency: Take 1 capsule (300 mg total) by mouth daily at bedtime for 7 days, THEN 2 capsules (600 mg total) daily at bedtime for 7 days, THEN 3 capsules (900 mg total) daily at bedtime     Quantity: 90 capsule     Ordering Provider:   [] PCP/Provider -   [x] Speciality/Provider -  Craig Robert Goldberg, MD